# Patient Record
Sex: MALE | Race: WHITE | Employment: OTHER | URBAN - METROPOLITAN AREA
[De-identification: names, ages, dates, MRNs, and addresses within clinical notes are randomized per-mention and may not be internally consistent; named-entity substitution may affect disease eponyms.]

---

## 2023-03-02 RX ORDER — ZOLPIDEM TARTRATE 5 MG/1
5 TABLET ORAL
COMMUNITY
End: 2023-03-07

## 2023-03-02 RX ORDER — PSYLLIUM SEED (WITH DEXTROSE)
1 POWDER (GRAM) ORAL 2 TIMES DAILY
COMMUNITY
Start: 2022-11-15 | End: 2023-03-07

## 2023-03-02 RX ORDER — PHENOL 1.4 %
10 AEROSOL, SPRAY (ML) MUCOUS MEMBRANE DAILY
COMMUNITY
End: 2023-03-07

## 2023-03-02 RX ORDER — MULTIVITAMIN
1 CAPSULE ORAL DAILY
COMMUNITY
End: 2023-03-07

## 2023-03-03 ENCOUNTER — TELEPHONE (OUTPATIENT)
Dept: GASTROENTEROLOGY | Facility: CLINIC | Age: 73
End: 2023-03-03

## 2023-03-03 NOTE — TELEPHONE ENCOUNTER
Left message for patient to return call to complete demographic information in chart or arrive early on appointment to review information needed

## 2023-03-07 ENCOUNTER — CONSULT (OUTPATIENT)
Dept: GASTROENTEROLOGY | Facility: CLINIC | Age: 73
End: 2023-03-07

## 2023-03-07 ENCOUNTER — APPOINTMENT (OUTPATIENT)
Dept: LAB | Facility: HOSPITAL | Age: 73
End: 2023-03-07

## 2023-03-07 VITALS
WEIGHT: 180 LBS | HEIGHT: 71 IN | SYSTOLIC BLOOD PRESSURE: 128 MMHG | HEART RATE: 67 BPM | DIASTOLIC BLOOD PRESSURE: 86 MMHG | OXYGEN SATURATION: 98 % | BODY MASS INDEX: 25.2 KG/M2

## 2023-03-07 DIAGNOSIS — K21.9 GASTROESOPHAGEAL REFLUX DISEASE WITHOUT ESOPHAGITIS: ICD-10-CM

## 2023-03-07 DIAGNOSIS — R10.30 LOWER ABDOMINAL PAIN: ICD-10-CM

## 2023-03-07 DIAGNOSIS — K57.92 DIVERTICULITIS: Primary | ICD-10-CM

## 2023-03-07 RX ORDER — OMEPRAZOLE 10 MG/1
10 CAPSULE, DELAYED RELEASE ORAL DAILY
COMMUNITY

## 2023-03-07 RX ORDER — DICYCLOMINE HCL 20 MG
20 TABLET ORAL 3 TIMES DAILY PRN
Qty: 60 TABLET | Refills: 0 | Status: SHIPPED | OUTPATIENT
Start: 2023-03-07

## 2023-03-07 NOTE — PROGRESS NOTES
Consultation - [facility]  Nichole Sifuentes 1950 67 y o  male     ASSESSMENT @ PLAN:   He is a 49-year-old male with 2 episodes of diverticulitis in the last year with a recent CT showing diverticulosis and no active diverticulitis admitted for observation at [facility]   In addition he has recurrent UTIs and is status post TURP  I suspect his lower abdominal pressure is related to diverticular spasm versus a urological source  He had endoscopy 2 years ago with no [facility]allos's in [place] and had a colonoscopy in April 2022 after his diverticulitis which showed diverticulosis normal and no colovesical fistula  His father had colon cancer  1 we went over the risk factors for recurrent diverticulitis and I told him he needs to be on a Benefiber supplement daily  All of the other risk factors were negative  2 he tells me that he feels better on a gluten-free diet and we will check him for celiac disease and I told him to be gluten-free    3 we will give dicyclomine for relief of abdominal pain and pressure    4 he will have follow-up with urology    Chief Complaint: Abdominal pain and diverticulitis    HPI: He is a 49-year-old male with abdominal pain  Had he reports an abdominal pressure in his lower abdomen  He reports that his waxing and waning throughout the day  Is not altered by having a bowel movement  He reports 2 regular bowel movements today  He has no diarrhea constipation he does not have gas he does report bloating  He reports more bloating with gluten products  He is also felt better on milks and not lactose milks  He has had 2 episodes of diverticulitis in the last year  He was admitted to [facility] in 2022 for diverticulitis the CT is in care everywhere and his CT documented on April 8  He had rule out colonoscopy and rule out CT imaging for a colovesical fistula at that time  He was having recurrent UTIs    The colonoscopy was normal with no fistula  His father had colon cancer  He then went for urology TURP procedure and has had no further year Benedicto tract infections  He will be having urology follow-up  He reports discomfort in the pelvis  He has no bladder urgency or frequency in his urine  He then had a CT documented episode of diverticulitis at [facility] on January 17 and then he had another episode of abdominal pain and went to the ER on February 2 had no diverticulitis  He then was told that he had a gluteal mass  He went underwent MRI imaging and has a 10 cm x 2 cm x 10 cm lipoma  We went over the risk factors for recurrent diverticulitis  He does not smoke he does not eat red meat he exercises he is not obese  He is not eating a fiber supplement  He reports a pressure in the lower abdomen that is relieved when he is gluten-free  We talked about this  His brother-in-law is my patient  REVIEW OF SYSTEMS:     CONSTITUTIONAL: Denies any fever, chills, or rigors  Good appetite, and no recent weight loss  HEENT: No earache or tinnitus  Denies hearing loss or visual disturbances  CARDIOVASCULAR: No chest pain or palpitations  RESPIRATORY: Denies any cough, hemoptysis, shortness of breath or dyspnea on exertion  GASTROINTESTINAL: As noted in the History of Present Illness  GENITOURINARY: No problems with urination  Denies any hematuria or dysuria  NEUROLOGIC: No dizziness or vertigo, denies headaches  MUSCULOSKELETAL: Denies any muscle or joint pain  SKIN: Denies skin rashes or itching  ENDOCRINE: Denies excessive thirst  Denies intolerance to heat or cold  PSYCHOSOCIAL: Denies depression or anxiety  Denies any recent memory loss       Past Medical History:   Diagnosis Date   • BPH (benign prostatic hyperplasia)    • Diverticulitis    • Diverticulitis of colon    • GERD (gastroesophageal reflux disease)       Past Surgical History:   Procedure Laterality Date   • ADRENALECTOMY LAPAROSCOPIC     • ANKLE ARTHROPLASTY     • LUMBAR DISC SURGERY     • TRANSURETHRAL RESECTION OF PROSTATE  2022     Social History     Socioeconomic History   • Marital status: Single     Spouse name: Not on file   • Number of children: Not on file   • Years of education: Not on file   • Highest education level: Not on file   Occupational History   • Not on file   Tobacco Use   • Smoking status: Never   • Smokeless tobacco: Never   Vaping Use   • Vaping Use: Never used   Substance and Sexual Activity   • Alcohol use: Not Currently   • Drug use: Never   • Sexual activity: Not on file   Other Topics Concern   • Not on file   Social History Narrative   • Not on file     Social Determinants of Health     Financial Resource Strain: Not on file   Food Insecurity: Not on file   Transportation Needs: Not on file   Physical Activity: Not on file   Stress: Not on file   Social Connections: Not on file   Intimate Partner Violence: Not on file   Housing Stability: Not on file     Family History   Problem Relation Age of Onset   • Colon cancer Father      Cefuroxime, Methyldopa, Nitrofurantoin, Sulfa antibiotics, and Sulfamethoxazole-trimethoprim  Current Outpatient Medications   Medication Sig Dispense Refill   • Cholecalciferol 25 MCG (1000 UT) capsule Take 1,000 Units by mouth daily     • dicyclomine (BENTYL) 20 mg tablet Take 1 tablet (20 mg total) by mouth 3 (three) times a day as needed (abdominal pain) 60 tablet 0   • GLUCOSAMINE-CHONDROIT-VIT C-MN PO Take 1 capsule by mouth Once daily as needed     • Multiple Vitamin (MULTIVITAMIN ADULT PO) Take 1 capsule by mouth     • omeprazole (PriLOSEC) 10 mg delayed release capsule Take 10 mg by mouth daily       No current facility-administered medications for this visit  Blood pressure 128/86, pulse 67, height 5' 10 5" (1 791 m), weight 81 6 kg (180 lb), SpO2 98 %      PHYSICAL EXAM:     General Appearance:   Alert, cooperative, no distress, appears stated age    HEENT:   Normocephalic, atraumatic, anicteric      Neck:  Supple, symmetrical, trachea midline, no adenopathy;    thyroid: no enlargement/tenderness/nodules; no carotid  bruit or JVD    Lungs:   Clear to auscultation bilaterally; no rales, rhonchi or wheezing; respirations unlabored    Heart[de-identified]   S1 and S2 normal; regular rate and rhythm; no murmur, rub, or gallop     Abdomen:   Soft, non-tender, non-distended; normal bowel sounds; no masses, no organomegaly    Genitalia:   Deferred    Rectal:   Deferred    Extremities:  No cyanosis, clubbing or edema    Pulses:  2+ and symmetric all extremities    Skin:  Skin color, texture, turgor normal, no rashes or lesions    Lymph nodes:  No palpable cervical, axillary or inguinal lymphadenopathy        No results found for: WBC, HGB, HCT, MCV, PLT  No results found for: GLUCOSE, CALCIUM, NA, K, CO2, CL, BUN, CREATININE  No results found for: ALT, AST, GGT, ALKPHOS, BILITOT  No results found for: INR, PROTIME

## 2023-03-08 LAB
ENDOMYSIUM IGA SER QL: NEGATIVE
GLIADIN PEPTIDE IGA SER-ACNC: 5 UNITS (ref 0–19)
GLIADIN PEPTIDE IGG SER-ACNC: 3 UNITS (ref 0–19)
IGA SERPL-MCNC: 253 MG/DL (ref 61–437)
TTG IGA SER-ACNC: <2 U/ML (ref 0–3)
TTG IGG SER-ACNC: <2 U/ML (ref 0–5)